# Patient Record
Sex: FEMALE | Race: WHITE | Employment: PART TIME | URBAN - NONMETROPOLITAN AREA
[De-identification: names, ages, dates, MRNs, and addresses within clinical notes are randomized per-mention and may not be internally consistent; named-entity substitution may affect disease eponyms.]

---

## 2019-09-06 ENCOUNTER — TELEPHONE (OUTPATIENT)
Dept: CARDIOLOGY | Age: 69
End: 2019-09-06

## 2019-09-06 NOTE — TELEPHONE ENCOUNTER
I spoke to the patient who stated that she is faxing me records from previous cardiologist Dr. Seth Springer and will also bring a hard copy to her visit next week.

## 2019-09-09 ENCOUNTER — OFFICE VISIT (OUTPATIENT)
Dept: CARDIOLOGY | Age: 69
End: 2019-09-09
Payer: MEDICARE

## 2019-09-09 VITALS
BODY MASS INDEX: 28.16 KG/M2 | DIASTOLIC BLOOD PRESSURE: 78 MMHG | HEART RATE: 66 BPM | SYSTOLIC BLOOD PRESSURE: 126 MMHG | WEIGHT: 153 LBS | HEIGHT: 62 IN

## 2019-09-09 DIAGNOSIS — I10 ESSENTIAL HYPERTENSION: ICD-10-CM

## 2019-09-09 DIAGNOSIS — R06.02 SHORTNESS OF BREATH: ICD-10-CM

## 2019-09-09 DIAGNOSIS — R00.2 PALPITATIONS: Primary | ICD-10-CM

## 2019-09-09 DIAGNOSIS — R07.89 OTHER CHEST PAIN: ICD-10-CM

## 2019-09-09 DIAGNOSIS — I34.0 NON-RHEUMATIC MITRAL REGURGITATION: ICD-10-CM

## 2019-09-09 DIAGNOSIS — Z82.49 FAMILY HISTORY OF HEART DISEASE: ICD-10-CM

## 2019-09-09 DIAGNOSIS — E78.2 MIXED HYPERLIPIDEMIA: ICD-10-CM

## 2019-09-09 PROCEDURE — G8419 CALC BMI OUT NRM PARAM NOF/U: HCPCS | Performed by: CLINICAL NURSE SPECIALIST

## 2019-09-09 PROCEDURE — 99204 OFFICE O/P NEW MOD 45 MIN: CPT | Performed by: CLINICAL NURSE SPECIALIST

## 2019-09-09 PROCEDURE — 4040F PNEUMOC VAC/ADMIN/RCVD: CPT | Performed by: CLINICAL NURSE SPECIALIST

## 2019-09-09 PROCEDURE — G8400 PT W/DXA NO RESULTS DOC: HCPCS | Performed by: CLINICAL NURSE SPECIALIST

## 2019-09-09 PROCEDURE — 3017F COLORECTAL CA SCREEN DOC REV: CPT | Performed by: CLINICAL NURSE SPECIALIST

## 2019-09-09 PROCEDURE — 1123F ACP DISCUSS/DSCN MKR DOCD: CPT | Performed by: CLINICAL NURSE SPECIALIST

## 2019-09-09 PROCEDURE — 93000 ELECTROCARDIOGRAM COMPLETE: CPT | Performed by: CLINICAL NURSE SPECIALIST

## 2019-09-09 PROCEDURE — G8427 DOCREV CUR MEDS BY ELIG CLIN: HCPCS | Performed by: CLINICAL NURSE SPECIALIST

## 2019-09-09 PROCEDURE — 1090F PRES/ABSN URINE INCON ASSESS: CPT | Performed by: CLINICAL NURSE SPECIALIST

## 2019-09-09 PROCEDURE — 1036F TOBACCO NON-USER: CPT | Performed by: CLINICAL NURSE SPECIALIST

## 2019-09-09 PROCEDURE — 0296T PR EXT ECG > 48HR TO 21 DAY RCRD W/CONECT INTL RCRD: CPT | Performed by: CLINICAL NURSE SPECIALIST

## 2019-09-09 RX ORDER — COLCHICINE 0.6 MG/1
0.6 TABLET ORAL 2 TIMES DAILY
COMMUNITY

## 2019-09-09 RX ORDER — CARISOPRODOL 350 MG/1
350 TABLET ORAL 4 TIMES DAILY PRN
COMMUNITY

## 2019-09-09 RX ORDER — FLUOXETINE HYDROCHLORIDE 40 MG/1
40 CAPSULE ORAL DAILY
COMMUNITY

## 2019-09-09 RX ORDER — ROSUVASTATIN CALCIUM 10 MG/1
10 TABLET, COATED ORAL DAILY
COMMUNITY

## 2019-09-09 RX ORDER — HYDROCODONE BITARTRATE AND ACETAMINOPHEN 10; 325 MG/1; MG/1
1 TABLET ORAL EVERY 6 HOURS PRN
COMMUNITY

## 2019-09-09 RX ORDER — LORATADINE 10 MG/1
10 TABLET ORAL DAILY
COMMUNITY

## 2019-09-09 RX ORDER — FLUTICASONE PROPIONATE 50 MCG
1 SPRAY, SUSPENSION (ML) NASAL DAILY
COMMUNITY

## 2019-09-09 RX ORDER — PREGABALIN 50 MG/1
50 CAPSULE ORAL 3 TIMES DAILY
COMMUNITY

## 2019-09-09 RX ORDER — GUAIFENESIN 600 MG/1
600 TABLET, EXTENDED RELEASE ORAL 2 TIMES DAILY
COMMUNITY

## 2019-09-09 RX ORDER — AMLODIPINE BESYLATE 5 MG/1
10 TABLET ORAL DAILY
COMMUNITY

## 2019-09-09 RX ORDER — LISINOPRIL 40 MG/1
40 TABLET ORAL DAILY
COMMUNITY

## 2019-09-09 RX ORDER — ALBUTEROL SULFATE 90 UG/1
2 AEROSOL, METERED RESPIRATORY (INHALATION) EVERY 6 HOURS PRN
COMMUNITY

## 2019-09-09 RX ORDER — FUROSEMIDE 20 MG/1
20 TABLET ORAL PRN
COMMUNITY

## 2019-09-09 RX ORDER — FOLIC ACID 1 MG/1
1 TABLET ORAL DAILY
COMMUNITY
End: 2019-09-09

## 2019-09-09 RX ORDER — OMEPRAZOLE 20 MG/1
20 CAPSULE, DELAYED RELEASE ORAL DAILY
COMMUNITY

## 2019-09-09 RX ORDER — PREDNISONE 1 MG/1
5 TABLET ORAL PRN
COMMUNITY

## 2019-09-09 RX ORDER — RIZATRIPTAN BENZOATE 10 MG/1
10 TABLET ORAL
COMMUNITY

## 2019-09-09 RX ORDER — ATENOLOL 50 MG/1
50 TABLET ORAL DAILY
COMMUNITY
End: 2019-10-16

## 2019-09-09 ASSESSMENT — ENCOUNTER SYMPTOMS
EYE REDNESS: 0
ABDOMINAL PAIN: 0
FACIAL SWELLING: 0
VOMITING: 0
WHEEZING: 0
CHEST TIGHTNESS: 1
SHORTNESS OF BREATH: 1
COUGH: 0
NAUSEA: 0

## 2019-09-09 NOTE — PATIENT INSTRUCTIONS
[place] at the [address] [facility] and [address] [facility] located on the first floor of [place] [address] through hospital main entrance and turn immediately to your left. Patient's contact number:  [phone] (home)      Lexiscan Stress Test      Lexiscan (regadenoson injection) is a prescription drug given through an IV line that increases blood flow through the arteries of the heart during a cardiac nuclear stress test.     There are two parts to a Lexiscan stress test: the rest portion and the exercise portion. For the rest portion, a radioactive tracer is injected into your arm through the IV. After 30 to 60 minutes, the process of imaging will begin. A nuclear camera will be placed on your chest area and images are taken for the next 15 to 20 minutes. For the exercise portion, a nurse will attach EKG electrodes to your chest to monitor your heart rate. The drug Bernardine Del Rosario is administered to simulate stress on the heart. Your heart rhythm will then be monitored for the next few minutes. Your blood pressure will also be monitored throughout the exercise portion. Atlantic through the exercise portion, a second round of radioactive tracer is injected into your body. Your heart rate and EKG will be monitored for another few minutes after administering the drug. Test Preparation:     Bring a list of your current medications. Do not take any of your medications the morning of the test, but bring all morning medications with you as you will take them after the stress portion of the test is completed.  Do not eat Bananas 24 hours prior to test.     No caffeine 24 hours prior to the testing. This includes: coffee, pop/soda, chocolate, cold medications, etc.  Any product that might contain caffeine.  No nicotine or alcohol 12 hours prior to your test.    Nothing to eat or drink 4-6 hours prior to appointment time.   It is okay to drink small amounts of water during the four hours prior

## 2019-09-19 ENCOUNTER — TELEPHONE (OUTPATIENT)
Dept: CARDIOLOGY | Age: 69
End: 2019-09-19

## 2019-10-07 ENCOUNTER — HOSPITAL ENCOUNTER (OUTPATIENT)
Dept: NON INVASIVE DIAGNOSTICS | Age: 69
Discharge: HOME | End: 2019-10-07
Payer: MEDICARE

## 2019-10-07 ENCOUNTER — HOSPITAL ENCOUNTER (OUTPATIENT)
Dept: NUCLEAR MEDICINE | Age: 69
Discharge: HOME | End: 2019-10-09
Payer: MEDICARE

## 2019-10-07 DIAGNOSIS — R06.02 SHORTNESS OF BREATH: ICD-10-CM

## 2019-10-07 DIAGNOSIS — R07.89 OTHER CHEST PAIN: ICD-10-CM

## 2019-10-07 DIAGNOSIS — I34.0 NON-RHEUMATIC MITRAL REGURGITATION: ICD-10-CM

## 2019-10-07 DIAGNOSIS — R00.2 PALPITATIONS: ICD-10-CM

## 2019-10-07 LAB
LV EF: 58 %
LVEF MODALITY: NORMAL

## 2019-10-07 PROCEDURE — A9500 TC99M SESTAMIBI: HCPCS | Performed by: CLINICAL NURSE SPECIALIST

## 2019-10-07 PROCEDURE — 6360000002 HC RX W HCPCS: Performed by: CLINICAL NURSE SPECIALIST

## 2019-10-07 PROCEDURE — 93306 TTE W/DOPPLER COMPLETE: CPT

## 2019-10-07 PROCEDURE — 78452 HT MUSCLE IMAGE SPECT MULT: CPT

## 2019-10-07 PROCEDURE — 93017 CV STRESS TEST TRACING ONLY: CPT

## 2019-10-07 PROCEDURE — 3430000000 HC RX DIAGNOSTIC RADIOPHARMACEUTICAL: Performed by: CLINICAL NURSE SPECIALIST

## 2019-10-07 RX ADMIN — TETRAKIS(2-METHOXYISOBUTYLISOCYANIDE)COPPER(I) TETRAFLUOROBORATE 10 MILLICURIE: 1 INJECTION, POWDER, LYOPHILIZED, FOR SOLUTION INTRAVENOUS at 13:53

## 2019-10-07 RX ADMIN — TETRAKIS(2-METHOXYISOBUTYLISOCYANIDE)COPPER(I) TETRAFLUOROBORATE 30 MILLICURIE: 1 INJECTION, POWDER, LYOPHILIZED, FOR SOLUTION INTRAVENOUS at 13:53

## 2019-10-07 RX ADMIN — REGADENOSON 0.4 MG: 0.08 INJECTION, SOLUTION INTRAVENOUS at 14:36

## 2019-10-08 LAB
LV EF: 79 %
LVEF MODALITY: NORMAL

## 2019-10-16 ENCOUNTER — TELEPHONE (OUTPATIENT)
Dept: CARDIOLOGY | Age: 69
End: 2019-10-16

## 2019-10-16 ENCOUNTER — OFFICE VISIT (OUTPATIENT)
Dept: CARDIOLOGY | Age: 69
End: 2019-10-16
Payer: MEDICARE

## 2019-10-16 VITALS
SYSTOLIC BLOOD PRESSURE: 136 MMHG | DIASTOLIC BLOOD PRESSURE: 82 MMHG | WEIGHT: 154 LBS | HEIGHT: 62 IN | BODY MASS INDEX: 28.34 KG/M2 | HEART RATE: 68 BPM

## 2019-10-16 DIAGNOSIS — I10 ESSENTIAL HYPERTENSION: Primary | ICD-10-CM

## 2019-10-16 PROCEDURE — 99213 OFFICE O/P EST LOW 20 MIN: CPT | Performed by: INTERNAL MEDICINE

## 2019-10-16 RX ORDER — ATENOLOL 50 MG/1
50 TABLET ORAL 2 TIMES DAILY
Qty: 180 TABLET | Refills: 3 | Status: SHIPPED | OUTPATIENT
Start: 2019-10-16

## 2019-10-16 ASSESSMENT — ENCOUNTER SYMPTOMS
CONSTIPATION: 0
WHEEZING: 0
BLOOD IN STOOL: 0
SHORTNESS OF BREATH: 0
EYE DISCHARGE: 0
DIARRHEA: 0
BACK PAIN: 0
VOMITING: 0
ABDOMINAL DISTENTION: 0
COUGH: 0

## 2019-10-24 ENCOUNTER — HOSPITAL ENCOUNTER (OUTPATIENT)
Dept: CARDIAC CATH/INVASIVE PROCEDURES | Age: 69
Discharge: HOME | End: 2019-10-24
Attending: INTERNAL MEDICINE | Admitting: INTERNAL MEDICINE
Payer: MEDICARE

## 2019-10-24 VITALS
WEIGHT: 154 LBS | BODY MASS INDEX: 28.34 KG/M2 | HEART RATE: 59 BPM | RESPIRATION RATE: 14 BRPM | HEIGHT: 62 IN | OXYGEN SATURATION: 94 % | DIASTOLIC BLOOD PRESSURE: 88 MMHG | SYSTOLIC BLOOD PRESSURE: 150 MMHG | TEMPERATURE: 97.9 F

## 2019-10-24 LAB
ALBUMIN SERPL-MCNC: 4.6 G/DL (ref 3.5–5.2)
ALP BLD-CCNC: 127 U/L (ref 35–104)
ALT SERPL-CCNC: 18 U/L (ref 5–33)
ANION GAP SERPL CALCULATED.3IONS-SCNC: 11 MMOL/L (ref 7–19)
AST SERPL-CCNC: 30 U/L (ref 5–32)
BILIRUB SERPL-MCNC: 0.3 MG/DL (ref 0.2–1.2)
BUN BLDV-MCNC: 13 MG/DL (ref 8–23)
CALCIUM SERPL-MCNC: 9.2 MG/DL (ref 8.8–10.2)
CHLORIDE BLD-SCNC: 100 MMOL/L (ref 98–111)
CO2: 29 MMOL/L (ref 22–29)
CREAT SERPL-MCNC: 0.6 MG/DL (ref 0.5–0.9)
GFR NON-AFRICAN AMERICAN: >60
GLUCOSE BLD-MCNC: 97 MG/DL (ref 74–109)
HCT VFR BLD CALC: 47 % (ref 37–47)
HEMOGLOBIN: 15.2 G/DL (ref 12–16)
MCH RBC QN AUTO: 32.4 PG (ref 27–31)
MCHC RBC AUTO-ENTMCNC: 32.3 G/DL (ref 33–37)
MCV RBC AUTO: 100.2 FL (ref 81–99)
PDW BLD-RTO: 12 % (ref 11.5–14.5)
PLATELET # BLD: 220 K/UL (ref 130–400)
PMV BLD AUTO: 10.5 FL (ref 9.4–12.3)
POTASSIUM SERPL-SCNC: 4.6 MMOL/L (ref 3.5–5)
RBC # BLD: 4.69 M/UL (ref 4.2–5.4)
SODIUM BLD-SCNC: 140 MMOL/L (ref 136–145)
TOTAL PROTEIN: 7.4 G/DL (ref 6.6–8.7)
WBC # BLD: 7 K/UL (ref 4.8–10.8)

## 2019-10-24 PROCEDURE — 2580000003 HC RX 258: Performed by: INTERNAL MEDICINE

## 2019-10-24 PROCEDURE — C1769 GUIDE WIRE: HCPCS

## 2019-10-24 PROCEDURE — 6360000002 HC RX W HCPCS

## 2019-10-24 PROCEDURE — 2500000003 HC RX 250 WO HCPCS

## 2019-10-24 PROCEDURE — C1887 CATHETER, GUIDING: HCPCS

## 2019-10-24 PROCEDURE — C1894 INTRO/SHEATH, NON-LASER: HCPCS

## 2019-10-24 PROCEDURE — 85027 COMPLETE CBC AUTOMATED: CPT

## 2019-10-24 PROCEDURE — 93458 L HRT ARTERY/VENTRICLE ANGIO: CPT | Performed by: INTERNAL MEDICINE

## 2019-10-24 PROCEDURE — 99152 MOD SED SAME PHYS/QHP 5/>YRS: CPT

## 2019-10-24 PROCEDURE — 36415 COLL VENOUS BLD VENIPUNCTURE: CPT

## 2019-10-24 PROCEDURE — 99152 MOD SED SAME PHYS/QHP 5/>YRS: CPT | Performed by: INTERNAL MEDICINE

## 2019-10-24 PROCEDURE — 93458 L HRT ARTERY/VENTRICLE ANGIO: CPT

## 2019-10-24 PROCEDURE — 80053 COMPREHEN METABOLIC PANEL: CPT

## 2019-10-24 PROCEDURE — 6360000004 HC RX CONTRAST MEDICATION: Performed by: INTERNAL MEDICINE

## 2019-10-24 PROCEDURE — 2709999900 HC NON-CHARGEABLE SUPPLY

## 2019-10-24 RX ORDER — SODIUM CHLORIDE 9 MG/ML
INJECTION, SOLUTION INTRAVENOUS CONTINUOUS
Status: DISCONTINUED | OUTPATIENT
Start: 2019-10-24 | End: 2019-10-24 | Stop reason: HOSPADM

## 2019-10-24 RX ORDER — SODIUM CHLORIDE 0.9 % (FLUSH) 0.9 %
10 SYRINGE (ML) INJECTION EVERY 12 HOURS SCHEDULED
Status: DISCONTINUED | OUTPATIENT
Start: 2019-10-24 | End: 2019-10-24 | Stop reason: HOSPADM

## 2019-10-24 RX ORDER — ONDANSETRON 2 MG/ML
4 INJECTION INTRAMUSCULAR; INTRAVENOUS EVERY 6 HOURS PRN
Status: DISCONTINUED | OUTPATIENT
Start: 2019-10-24 | End: 2019-10-24 | Stop reason: HOSPADM

## 2019-10-24 RX ORDER — ALPRAZOLAM 0.5 MG/1
0.5 TABLET ORAL
Status: DISCONTINUED | OUTPATIENT
Start: 2019-10-24 | End: 2019-10-24 | Stop reason: HOSPADM

## 2019-10-24 RX ORDER — NITROGLYCERIN 0.4 MG/1
0.4 TABLET SUBLINGUAL EVERY 5 MIN PRN
Status: DISCONTINUED | OUTPATIENT
Start: 2019-10-24 | End: 2019-10-24 | Stop reason: HOSPADM

## 2019-10-24 RX ORDER — ACETAMINOPHEN 325 MG/1
650 TABLET ORAL EVERY 4 HOURS PRN
Status: DISCONTINUED | OUTPATIENT
Start: 2019-10-24 | End: 2019-10-24 | Stop reason: HOSPADM

## 2019-10-24 RX ORDER — SODIUM CHLORIDE 0.9 % (FLUSH) 0.9 %
10 SYRINGE (ML) INJECTION PRN
Status: DISCONTINUED | OUTPATIENT
Start: 2019-10-24 | End: 2019-10-24 | Stop reason: HOSPADM

## 2019-10-24 RX ADMIN — IOPAMIDOL 62 ML: 612 INJECTION, SOLUTION INTRAVENOUS at 10:53

## 2019-10-24 RX ADMIN — SODIUM CHLORIDE: 9 INJECTION, SOLUTION INTRAVENOUS at 10:09

## 2020-01-17 ENCOUNTER — OFFICE VISIT (OUTPATIENT)
Dept: CARDIOLOGY | Age: 70
End: 2020-01-17
Payer: MEDICARE

## 2020-01-17 VITALS
WEIGHT: 155 LBS | SYSTOLIC BLOOD PRESSURE: 108 MMHG | HEIGHT: 62 IN | BODY MASS INDEX: 28.52 KG/M2 | HEART RATE: 68 BPM | DIASTOLIC BLOOD PRESSURE: 70 MMHG

## 2020-01-17 PROCEDURE — G8484 FLU IMMUNIZE NO ADMIN: HCPCS | Performed by: INTERNAL MEDICINE

## 2020-01-17 PROCEDURE — 4040F PNEUMOC VAC/ADMIN/RCVD: CPT | Performed by: INTERNAL MEDICINE

## 2020-01-17 PROCEDURE — 1123F ACP DISCUSS/DSCN MKR DOCD: CPT | Performed by: INTERNAL MEDICINE

## 2020-01-17 PROCEDURE — 3017F COLORECTAL CA SCREEN DOC REV: CPT | Performed by: INTERNAL MEDICINE

## 2020-01-17 PROCEDURE — G8427 DOCREV CUR MEDS BY ELIG CLIN: HCPCS | Performed by: INTERNAL MEDICINE

## 2020-01-17 PROCEDURE — 1090F PRES/ABSN URINE INCON ASSESS: CPT | Performed by: INTERNAL MEDICINE

## 2020-01-17 PROCEDURE — 1036F TOBACCO NON-USER: CPT | Performed by: INTERNAL MEDICINE

## 2020-01-17 PROCEDURE — G8417 CALC BMI ABV UP PARAM F/U: HCPCS | Performed by: INTERNAL MEDICINE

## 2020-01-17 PROCEDURE — G8400 PT W/DXA NO RESULTS DOC: HCPCS | Performed by: INTERNAL MEDICINE

## 2020-01-17 PROCEDURE — 99213 OFFICE O/P EST LOW 20 MIN: CPT | Performed by: INTERNAL MEDICINE

## 2020-01-17 ASSESSMENT — ENCOUNTER SYMPTOMS
CONSTIPATION: 0
DIARRHEA: 0
COUGH: 0
BLOOD IN STOOL: 0
EYE DISCHARGE: 0
WHEEZING: 0
SHORTNESS OF BREATH: 0
BACK PAIN: 0
ABDOMINAL DISTENTION: 0
VOMITING: 0

## 2020-01-17 NOTE — PROGRESS NOTES
[address] [facility]  Cardiology Office Note  [address] [id number]  Phone: [phone]  Fax: [phone]                            Date:  1/17/2020  Patient: Julius Fernandez  Age:  78 y.o., 1950    Referral: No ref. provider found      PROBLEM LIST:    Patient Active Problem List    Diagnosis Date Noted    Coronary artery disease involving native coronary artery      Priority: High    Essential hypertension 09/09/2019     Priority: Low    Mixed hyperlipidemia 09/09/2019     Priority: Low     1.  Severe exertional fatigue x6 months, negative Lexiscan study, normal LV ejection fraction, mild mitral regurgitation, mild aortic regurgitation, nonobstructive CAD with normal LV ejection fraction (catheterization 10/24/2019). 2.  Palpitations with Holter showing brief bouts of SVT with significant PACs.  EKG with sinus, right bundle branch block. 3.  Hypertension. 4.  History of SLE. 5.  Strong family history of premature coronary artery disease    PRESENTATION: Julius Fernandez is a 78y.o. year old female presents for follow-up evaluation. Since her last visit she did undergo cardiac catheterization which revealed nonobstructive CAD. Since changing her atenolol dosing at night to later she has been feeling much better. She reports less fatigue as well. REVIEW OF SYSTEMS:  Review of Systems   Constitutional: Negative for activity change, fatigue and fever. HENT: Negative for ear pain, hearing loss and tinnitus. Eyes: Negative for discharge and visual disturbance. Respiratory: Negative for cough, shortness of breath and wheezing. Cardiovascular: Negative for chest pain, palpitations and leg swelling. Gastrointestinal: Negative for abdominal distention, blood in stool, constipation, diarrhea and vomiting. Endocrine: Negative for cold intolerance, heat intolerance, polydipsia and polyuria. Genitourinary: Negative for dysuria and hematuria. Musculoskeletal: Negative for arthralgias, back pain and myalgias. Skin: Negative for pallor and rash. Neurological: Negative for seizures, syncope, weakness and headaches. Psychiatric/Behavioral: Negative for behavioral problems and dysphoric mood. Past Medical History:      Diagnosis Date    GERD (gastroesophageal reflux disease)     Gout     Hyperglycemia     Hyperlipidemia     Hypertension     Migraine     Systemic lupus erythematosus (HCC)        Past Surgical History:      Procedure Laterality Date    BREAST BIOPSY      HYSTERECTOMY, TOTAL ABDOMINAL      KNEE ARTHROSCOPY Right        Medications:  Current Outpatient Medications   Medication Sig Dispense Refill    atenolol (TENORMIN) 50 MG tablet Take 1 tablet by mouth 2 times daily 180 tablet 3    pregabalin (LYRICA) 50 MG capsule Take 50 mg by mouth 3 times daily.  amLODIPine (NORVASC) 5 MG tablet Take 5 mg by mouth daily      estrogens, conjugated, (PREMARIN) 0.9 MG tablet Take 0.9 mg by mouth three times a week       FLUoxetine (PROZAC) 40 MG capsule Take 40 mg by mouth daily      rosuvastatin (CRESTOR) 10 MG tablet Take 10 mg by mouth daily      lisinopril (PRINIVIL;ZESTRIL) 40 MG tablet Take 40 mg by mouth daily      albuterol sulfate  (90 Base) MCG/ACT inhaler Inhale 2 puffs into the lungs every 6 hours as needed for Wheezing      aspirin 81 MG tablet Take 81 mg by mouth daily      carisoprodol (SOMA) 350 MG tablet Take 350 mg by mouth 4 times daily as needed for Muscle spasms.  rizatriptan (MAXALT) 10 MG tablet Take 10 mg by mouth once as needed for Migraine May repeat in 2 hours if needed      furosemide (LASIX) 20 MG tablet Take 20 mg by mouth as needed       colchicine (COLCRYS) 0.6 MG tablet Take 0.6 mg by mouth 2 times daily       HYDROcodone-acetaminophen (NORCO)  MG per tablet Take 1 tablet by mouth every 6 hours as needed for Pain.       omeprazole (PRILOSEC) 20 MG delayed release on file   Social History Narrative    Not on file       Family History:       Problem Relation Age of Onset    Hypertension Mother     Diabetes Mother     Coronary Art Dis Father     Hypertension Father     Heart Attack Paternal Grandfather          age 43 of [place]         Physical Examination:  /70   Pulse 68   Ht 5' 2\" (1.575 m)   Wt 155 lb (70.3 kg)   BMI 28.35 kg/m²   Physical Exam  Constitutional:       General: She is not in acute distress. Appearance: She is not diaphoretic. Comments: Blood pressure right arm sitting 110/70 mmHg, pulse 64 bpm regular   HENT:      Mouth/Throat:      Pharynx: No oropharyngeal exudate. Eyes:      General: No scleral icterus. Right eye: No discharge. Left eye: No discharge. Neck:      Thyroid: No thyromegaly. Vascular: No JVD. Cardiovascular:      Rate and Rhythm: Normal rate and regular rhythm. No extrasystoles are present. Heart sounds: Normal heart sounds, S1 normal and S2 normal. No murmur. No systolic murmur. No diastolic murmur. No friction rub. No gallop. No S3 or S4 sounds. Pulmonary:      Effort: Pulmonary effort is normal. No respiratory distress. Breath sounds: Normal breath sounds. No wheezing or rales. Chest:      Chest wall: No tenderness. Abdominal:      General: Bowel sounds are normal. There is no distension. Palpations: Abdomen is soft. There is no mass. Tenderness: There is no tenderness. There is no guarding or rebound. Hernia: No hernia is present. Musculoskeletal: Normal range of motion. Skin:     General: Skin is warm. Coloration: Skin is not pale. Findings: No rash. Neurological:      Mental Status: She is alert and oriented to person, place, and time. Cranial Nerves: No cranial nerve deficit. Deep Tendon Reflexes: Reflexes normal.           Labs:   CBC: No results for input(s): WBC, HGB, HCT, PLT in the last 72 hours.   BMP:No results for input(s): NA, K, CO2, BUN, CREATININE, LABGLOM, GLUCOSE in the last 72 hours. BNP: No results for input(s): BNP in the last 72 hours. PT/INR: No results for input(s): PROTIME, INR in the last 72 hours. APTT:No results for input(s): APTT in the last 72 hours. CARDIAC ENZYMES:No results for input(s): CKTOTAL, CKMB, CKMBINDEX, TROPONINI in the last 72 hours. FASTING LIPID PANEL:No results found for: HDL, LDLDIRECT, LDLCALC, TRIG  LIVER PROFILE:No results for input(s): AST, ALT, LABALBU in the last 72 hours. Imaging:     Procedure     Procedure Type      Diagnostic procedure:DCA, Coronary Angiogram, Left Heart Cath, Left   Ventriculogram, Left Ventricular Pressure Measurement      Conclusions      Nonobstructive CAD. Normal LV ejection fraction. Mild elevation LV end-diastolic pressures consistent with mild diastolic   dysfunction. Recommendations      Medical management      Signatures      ----------------------------------------------------------------   Electronically signed by Coral Jeffers MD(Performing Physician) on   10/24/2019 11:24   ----------------------------------------------------------------      Angiographic Findings      Cardiac Arteries and Lesion Findings     LMCA: Left Main widely patent     LAD: LAD mid 35% stenosis       Lesion on Mid LAD: 35% stenosis 12 mm length. LCx: Circumflex proximal 20% stenosis       Lesion on Prox CX: 20% stenosis 6 mm length. RCA: RCA mid 30% stenosis. Lesion on Prox RCA: 30% stenosis 15 mm length.       VA     Ventriculography Findings  LVEDP equals 18 mmHg     LV function assessed as:Normal.  Ejection Fraction    - Method: LV gram. EF%: 60.       ASSESSMENT and PLAN:    76-year-old female patient with past medical history of hypertension, SLE, family history of premature coronary artery disease, presenting with progressive and worsening exertional fatigue over the last 6 months with increased palpitations consistent with PACs and short bouts of

## 2020-07-20 ENCOUNTER — OFFICE VISIT (OUTPATIENT)
Dept: CARDIOLOGY | Age: 70
End: 2020-07-20
Payer: MEDICARE

## 2020-07-20 VITALS
SYSTOLIC BLOOD PRESSURE: 98 MMHG | HEART RATE: 67 BPM | DIASTOLIC BLOOD PRESSURE: 68 MMHG | WEIGHT: 143 LBS | BODY MASS INDEX: 26.31 KG/M2 | HEIGHT: 62 IN

## 2020-07-20 PROCEDURE — G8400 PT W/DXA NO RESULTS DOC: HCPCS | Performed by: CLINICAL NURSE SPECIALIST

## 2020-07-20 PROCEDURE — 1123F ACP DISCUSS/DSCN MKR DOCD: CPT | Performed by: CLINICAL NURSE SPECIALIST

## 2020-07-20 PROCEDURE — 1090F PRES/ABSN URINE INCON ASSESS: CPT | Performed by: CLINICAL NURSE SPECIALIST

## 2020-07-20 PROCEDURE — G8417 CALC BMI ABV UP PARAM F/U: HCPCS | Performed by: CLINICAL NURSE SPECIALIST

## 2020-07-20 PROCEDURE — 99213 OFFICE O/P EST LOW 20 MIN: CPT | Performed by: CLINICAL NURSE SPECIALIST

## 2020-07-20 PROCEDURE — G8427 DOCREV CUR MEDS BY ELIG CLIN: HCPCS | Performed by: CLINICAL NURSE SPECIALIST

## 2020-07-20 PROCEDURE — 1036F TOBACCO NON-USER: CPT | Performed by: CLINICAL NURSE SPECIALIST

## 2020-07-20 PROCEDURE — 4040F PNEUMOC VAC/ADMIN/RCVD: CPT | Performed by: CLINICAL NURSE SPECIALIST

## 2020-07-20 PROCEDURE — 3017F COLORECTAL CA SCREEN DOC REV: CPT | Performed by: CLINICAL NURSE SPECIALIST

## 2020-07-20 RX ORDER — FOLIC ACID 1 MG/1
TABLET ORAL
COMMUNITY
Start: 2020-07-06

## 2020-07-20 ASSESSMENT — ENCOUNTER SYMPTOMS
CHEST TIGHTNESS: 0
NAUSEA: 0
COUGH: 0
SHORTNESS OF BREATH: 0
WHEEZING: 0
VOMITING: 0
ABDOMINAL PAIN: 0
EYE REDNESS: 0
FACIAL SWELLING: 0

## 2020-07-20 NOTE — PROGRESS NOTES
[facility], [address], [address] [address]  Phone: [phone]  Fax: [phone]    OFFICE VISIT:  2020    [place]iver[place]: 1950    Reason For Visit:  Robin Knowles is a 78 y.o. female who is here for Palpitations (No cardiac sx today. ) and Hypertension    HPI  Patient is here for follow-up today with a history of nonobstructive CAD found per heart cath last year. Other history includes hypertension hyperlipidemia. She has lost weight since last visit and is trying to get goingan exercise regimen. Priscila Plasencia is PCP.   Suyapa Emmanuel has the following history as recorded in [facility]:    Patient Active Problem List    Diagnosis Date Noted    Coronary artery disease involving native coronary artery      Priority: High    Essential hypertension 2019    Mixed hyperlipidemia 2019     Past Medical History:   Diagnosis Date    GERD (gastroesophageal reflux disease)     Gout     Hyperglycemia     Hyperlipidemia     Hypertension     Migraine     Psoriatic arthritis ([address]     Systemic lupus erythematosus (HCC)      Past Surgical History:   Procedure Laterality Date    BREAST BIOPSY      HYSTERECTOMY, TOTAL ABDOMINAL      KNEE ARTHROSCOPY Right      Family History   Problem Relation Age of Onset    Hypertension Mother     Diabetes Mother     Coronary Art Dis Father     Hypertension Father     Heart Attack Paternal Grandfather          age 43 of [place]     Social History     Tobacco Use    Smoking status: Never Smoker    Smokeless tobacco: Never Used   Substance Use Topics    Alcohol use: Not Currently      Current Outpatient Medications   Medication Sig Dispense Refill    methotrexate (RHEUMATREX) 2.5 MG chemo tablet Take 2.5 mg by mouth every 7 days Only on Monday      folic acid (FOLVITE) 1 MG tablet TAKE ONE TABLET BY MOUTH EVERY DAY      atenolol (TENORMIN) 50 MG tablet Take 1 tablet by mouth 2 times daily 180 tablet 3    pregabalin (LYRICA) 50 MG capsule Take 50 mg by mouth 3 times daily.  amLODIPine (NORVASC) 5 MG tablet Take 5 mg by mouth daily      estrogens, conjugated, (PREMARIN) 0.9 MG tablet Take 0.9 mg by mouth three times a week       FLUoxetine (PROZAC) 40 MG capsule Take 40 mg by mouth daily      rosuvastatin (CRESTOR) 10 MG tablet Take 10 mg by mouth daily      lisinopril (PRINIVIL;ZESTRIL) 40 MG tablet Take 40 mg by mouth daily      albuterol sulfate  (90 Base) MCG/ACT inhaler Inhale 2 puffs into the lungs every 6 hours as needed for Wheezing      aspirin 81 MG tablet Take 81 mg by mouth daily      carisoprodol (SOMA) 350 MG tablet Take 350 mg by mouth 4 times daily as needed for Muscle spasms.  rizatriptan (MAXALT) 10 MG tablet Take 10 mg by mouth once as needed for Migraine May repeat in 2 hours if needed      furosemide (LASIX) 20 MG tablet Take 20 mg by mouth as needed       colchicine (COLCRYS) 0.6 MG tablet Take 0.6 mg by mouth 2 times daily       HYDROcodone-acetaminophen (NORCO)  MG per tablet Take 1 tablet by mouth every 6 hours as needed for Pain.  omeprazole (PRILOSEC) 20 MG delayed release capsule Take 20 mg by mouth daily      predniSONE (DELTASONE) 5 MG tablet Take 5 mg by mouth as needed       fluticasone (FLONASE) 50 MCG/ACT nasal spray 1 spray by Each Nostril route daily      loratadine (CLARITIN) 10 MG tablet Take 10 mg by mouth daily      guaiFENesin (MUCINEX) 600 MG extended release tablet Take 600 mg by mouth 2 times daily        No current facility-administered medications for this visit. Allergies: Biaxin [clarithromycin]; Hctz [hydrochlorothiazide]; Hepatitis b virus vaccines; Imitrex [sumatriptan]; Influenza vaccines; Pcn [penicillins]; Sulfa antibiotics; and Vioxx [rofecoxib]    Review of Systems  Review of Systems   Constitutional: Positive for fatigue (improved). Negative for activity change, diaphoresis, fever and unexpected weight change. HENT: Negative for facial swelling and nosebleeds. Eyes: Negative for redness and visual disturbance. Respiratory: Negative for cough, chest tightness, shortness of breath and wheezing. Cardiovascular: Positive for palpitations (rare, brief). Negative for chest pain and leg swelling. Gastrointestinal: Negative for abdominal pain, nausea and vomiting. Endocrine: Negative for cold intolerance and heat intolerance. Genitourinary: Negative for dysuria and hematuria. Musculoskeletal: Negative for arthralgias and myalgias. Skin: Negative for pallor and rash. Neurological: Negative for dizziness, seizures, syncope, weakness and light-headedness. Hematological: Does not bruise/bleed easily. Psychiatric/Behavioral: Negative for agitation. The patient is not nervous/anxious. Objective  Vital Signs - BP 98/68   Pulse 67   Ht 5' 2\" (1.575 m)   Wt 143 lb (64.9 kg)   Breastfeeding No   BMI 26.16 kg/m²    Wt Readings from Last 3 Encounters:   07/20/20 143 lb (64.9 kg)   01/17/20 155 lb (70.3 kg)   10/24/19 154 lb (69.9 kg)      Physical Exam  Vitals signs and nursing note reviewed. Constitutional:       General: She is not in acute distress. Appearance: Normal appearance. She is well-developed. HENT:      Head: Normocephalic and atraumatic. Right Ear: Hearing and external ear normal.      Left Ear: Hearing and external ear normal.      Nose: Nose normal.   Eyes:      General:         Right eye: No discharge. Left eye: No discharge. Pupils: Pupils are equal, round, and reactive to light. Neck:      Musculoskeletal: Neck supple. No muscular tenderness. Thyroid: No thyromegaly. Vascular: No carotid bruit or JVD. Trachea: No tracheal deviation. Cardiovascular:      Rate and Rhythm: Normal rate and regular rhythm. Heart sounds: Murmur (1/6 systolic) present. No friction rub. No gallop.        Comments: No carotid bruit  Pulmonary:      Effort:

## 2020-07-20 NOTE — PATIENT INSTRUCTIONS
Return for APRN. 1 year  Encourage healthy diet and exercise  Keep cholesterol and BP under control    Call with any questionsor concerns  Follow up with Car Sidhu for non cardiac problems  Report any new problems  Cardiovascular Fitness-Exercise as tolerated. Strive for 15 minutes of exercise most days of the week. Cardiac / HealthyDiet  Continue current medications as directed  Continue plan of treatment  It is always recommended that you bring your medicationsbottles with you to each visit - this is for your safety!

## 2021-07-20 ENCOUNTER — OFFICE VISIT (OUTPATIENT)
Dept: CARDIOLOGY CLINIC | Age: 71
End: 2021-07-20
Payer: MEDICARE

## 2021-07-20 VITALS
DIASTOLIC BLOOD PRESSURE: 72 MMHG | BODY MASS INDEX: 29.26 KG/M2 | WEIGHT: 159 LBS | HEART RATE: 66 BPM | SYSTOLIC BLOOD PRESSURE: 100 MMHG | OXYGEN SATURATION: 98 % | HEIGHT: 62 IN

## 2021-07-20 DIAGNOSIS — I25.10 CORONARY ARTERY DISEASE INVOLVING NATIVE CORONARY ARTERY OF NATIVE HEART WITHOUT ANGINA PECTORIS: Primary | ICD-10-CM

## 2021-07-20 DIAGNOSIS — I10 ESSENTIAL HYPERTENSION: ICD-10-CM

## 2021-07-20 DIAGNOSIS — E78.2 MIXED HYPERLIPIDEMIA: ICD-10-CM

## 2021-07-20 PROCEDURE — 4040F PNEUMOC VAC/ADMIN/RCVD: CPT | Performed by: CLINICAL NURSE SPECIALIST

## 2021-07-20 PROCEDURE — 1090F PRES/ABSN URINE INCON ASSESS: CPT | Performed by: CLINICAL NURSE SPECIALIST

## 2021-07-20 PROCEDURE — 3017F COLORECTAL CA SCREEN DOC REV: CPT | Performed by: CLINICAL NURSE SPECIALIST

## 2021-07-20 PROCEDURE — 99213 OFFICE O/P EST LOW 20 MIN: CPT | Performed by: CLINICAL NURSE SPECIALIST

## 2021-07-20 PROCEDURE — G8400 PT W/DXA NO RESULTS DOC: HCPCS | Performed by: CLINICAL NURSE SPECIALIST

## 2021-07-20 PROCEDURE — 1036F TOBACCO NON-USER: CPT | Performed by: CLINICAL NURSE SPECIALIST

## 2021-07-20 PROCEDURE — G8427 DOCREV CUR MEDS BY ELIG CLIN: HCPCS | Performed by: CLINICAL NURSE SPECIALIST

## 2021-07-20 PROCEDURE — 1123F ACP DISCUSS/DSCN MKR DOCD: CPT | Performed by: CLINICAL NURSE SPECIALIST

## 2021-07-20 PROCEDURE — 93000 ELECTROCARDIOGRAM COMPLETE: CPT | Performed by: CLINICAL NURSE SPECIALIST

## 2021-07-20 PROCEDURE — G8417 CALC BMI ABV UP PARAM F/U: HCPCS | Performed by: CLINICAL NURSE SPECIALIST

## 2021-07-20 ASSESSMENT — ENCOUNTER SYMPTOMS
SHORTNESS OF BREATH: 0
VOMITING: 0
FACIAL SWELLING: 0
EYE REDNESS: 0
COUGH: 0
WHEEZING: 0
NAUSEA: 0
CHEST TIGHTNESS: 0
ABDOMINAL PAIN: 0

## 2021-07-20 NOTE — PATIENT INSTRUCTIONS
Return in about 1 year (around 7/20/2022) for APRN.  1 year  Encourage healthy diet and exercise  Keep cholesterol and BP under control

## 2021-07-20 NOTE — PROGRESS NOTES
[facility], [address] [place] [address], [address] [address]  Phone: [phone]  Fax: [phone]    OFFICE VISIT:  2021    Fiorella Ayala - : 1950    Reason For Visit:  Lynn Cifuentes is a 69 y.o. female who is here for Follow-up (Patient states BP has been up and c/o edema in ankles and feet), Coronary Artery Disease, and Hypertension    HPI  Patient is here for follow-up today with a history of nonobstructive CAD found per heart cath in 2019. Other history includes hypertension hyperlipidemia. Patient reports she has recently had issues with hypertension and and amlodipine has been added. She also reports some significant lower extremity edema because she forgot to take her Lasix with her on a recent trip. She is back to baseline now. Patient denies sudden weight gain, chest pain, orthopnea, PND, palpitations. Constantin Edward is PCP.   Fiorella Ayala has the following history as recorded in [facility]:    Patient Active Problem List    Diagnosis Date Noted    Coronary artery disease involving native coronary artery     Essential hypertension 2019    Mixed hyperlipidemia 2019     Past Medical History:   Diagnosis Date    GERD (gastroesophageal reflux disease)     Gout     Hyperglycemia     Hyperlipidemia     Hypertension     Migraine     [address] [address]     Systemic lupus erythematosus (HCC)      Past Surgical History:   Procedure Laterality Date    BREAST BIOPSY      HYSTERECTOMY, TOTAL ABDOMINAL      KNEE ARTHROSCOPY Right      Family History   Problem Relation Age of Onset    Hypertension Mother     Diabetes Mother     Coronary Art Dis Father     Hypertension Father     Heart Attack Paternal Grandfather          age 43 of [place]     Social History     Tobacco Use    Smoking status: Never Smoker    Smokeless tobacco: Never Used   Substance Use Topics    Alcohol use: Not Currently      Current Outpatient Medications Medication Sig Dispense Refill    methotrexate (RHEUMATREX) 2.5 MG chemo tablet Take 2.5 mg by mouth every 7 days Only on Monday      folic acid (FOLVITE) 1 MG tablet TAKE ONE TABLET BY MOUTH EVERY DAY      atenolol (TENORMIN) 50 MG tablet Take 1 tablet by mouth 2 times daily 180 tablet 3    pregabalin (LYRICA) 50 MG capsule Take 50 mg by mouth 3 times daily.  amLODIPine (NORVASC) 5 MG tablet Take 10 mg by mouth daily       estrogens, conjugated, (PREMARIN) 0.9 MG tablet Take 0.9 mg by mouth three times a week       FLUoxetine (PROZAC) 40 MG capsule Take 40 mg by mouth daily      rosuvastatin (CRESTOR) 10 MG tablet Take 10 mg by mouth daily      lisinopril (PRINIVIL;ZESTRIL) 40 MG tablet Take 40 mg by mouth daily      albuterol sulfate  (90 Base) MCG/ACT inhaler Inhale 2 puffs into the lungs every 6 hours as needed for Wheezing      aspirin 81 MG tablet Take 81 mg by mouth daily      carisoprodol (SOMA) 350 MG tablet Take 350 mg by mouth 4 times daily as needed for Muscle spasms.  rizatriptan (MAXALT) 10 MG tablet Take 10 mg by mouth once as needed for Migraine May repeat in 2 hours if needed      furosemide (LASIX) 20 MG tablet Take 20 mg by mouth as needed       colchicine (COLCRYS) 0.6 MG tablet Take 0.6 mg by mouth 2 times daily       HYDROcodone-acetaminophen (NORCO)  MG per tablet Take 1 tablet by mouth every 6 hours as needed for Pain.  omeprazole (PRILOSEC) 20 MG delayed release capsule Take 20 mg by mouth daily      predniSONE (DELTASONE) 5 MG tablet Take 5 mg by mouth as needed       fluticasone (FLONASE) 50 MCG/ACT nasal spray 1 spray by Each Nostril route daily      loratadine (CLARITIN) 10 MG tablet Take 10 mg by mouth daily      guaiFENesin (MUCINEX) 600 MG extended release tablet Take 600 mg by mouth 2 times daily        No current facility-administered medications for this visit.      Allergies: Biaxin [clarithromycin], Hctz [hydrochlorothiazide], Hepatitis b virus vaccines, Imitrex [sumatriptan], Influenza vaccines, Pcn [penicillins], Sulfa antibiotics, and Vioxx [rofecoxib]    Review of Systems  Review of Systems   Constitutional: Positive for fatigue (improved). Negative for activity change, diaphoresis, fever and unexpected weight change. HENT: Negative for facial swelling and nosebleeds. Eyes: Negative for redness and visual disturbance. Respiratory: Negative for cough, chest tightness, shortness of breath and wheezing. Cardiovascular: Negative for chest pain, palpitations and leg swelling. Gastrointestinal: Negative for abdominal pain, nausea and vomiting. Endocrine: Negative for cold intolerance and heat intolerance. Genitourinary: Negative for dysuria and hematuria. Musculoskeletal: Negative for arthralgias and myalgias. Skin: Negative for pallor and rash. Neurological: Negative for dizziness, seizures, syncope, weakness and light-headedness. Hematological: Does not bruise/bleed easily. Psychiatric/Behavioral: Negative for agitation. The patient is not nervous/anxious. Objective  Vital Signs - /72   Pulse 66   Ht 5' 2\" (1.575 m)   Wt 159 lb (72.1 kg)   SpO2 98%   BMI 29.08 kg/m²    Wt Readings from Last 3 Encounters:   07/20/21 159 lb (72.1 kg)   07/20/20 143 lb (64.9 kg)   01/17/20 155 lb (70.3 kg)      Physical Exam  Vitals and nursing note reviewed. Constitutional:       General: She is not in acute distress. Appearance: Normal appearance. She is well-developed. HENT:      Head: Normocephalic and atraumatic. Right Ear: Hearing and external ear normal.      Left Ear: Hearing and external ear normal.      Nose: Nose normal.   Eyes:      General:         Right eye: No discharge. Left eye: No discharge. Pupils: Pupils are equal, round, and reactive to light. Neck:      Thyroid: No thyromegaly. Vascular: No carotid bruit or JVD. Trachea: No tracheal deviation. recently    Plan    Return in about 1 year (around 7/20/2022) for APRN. 1 year  Encourage healthy diet and exercise  Keep cholesterol and BP under control    Call with any questionsor concerns  Follow up with Cassius Calderon for non cardiac problems  Report any new problems  Cardiovascular Fitness-Exercise as tolerated. Strive for 15 minutes of exercise most days of the week. Cardiac / HealthyDiet  Continue current medications as directed  Continue plan of treatment  It is always recommended that you bring your medicationsbottles with you to each visit - this is for your safety!        Suzan Gonzalez, APRN

## 2022-07-20 ENCOUNTER — OFFICE VISIT (OUTPATIENT)
Dept: CARDIOLOGY CLINIC | Age: 72
End: 2022-07-20
Payer: MEDICARE

## 2022-07-20 VITALS
DIASTOLIC BLOOD PRESSURE: 64 MMHG | HEART RATE: 76 BPM | BODY MASS INDEX: 27.23 KG/M2 | WEIGHT: 148 LBS | HEIGHT: 62 IN | SYSTOLIC BLOOD PRESSURE: 106 MMHG

## 2022-07-20 DIAGNOSIS — I10 ESSENTIAL HYPERTENSION: ICD-10-CM

## 2022-07-20 DIAGNOSIS — E78.2 MIXED HYPERLIPIDEMIA: ICD-10-CM

## 2022-07-20 DIAGNOSIS — I25.10 CORONARY ARTERY DISEASE INVOLVING NATIVE CORONARY ARTERY OF NATIVE HEART WITHOUT ANGINA PECTORIS: Primary | ICD-10-CM

## 2022-07-20 PROCEDURE — 1090F PRES/ABSN URINE INCON ASSESS: CPT | Performed by: CLINICAL NURSE SPECIALIST

## 2022-07-20 PROCEDURE — 3017F COLORECTAL CA SCREEN DOC REV: CPT | Performed by: CLINICAL NURSE SPECIALIST

## 2022-07-20 PROCEDURE — 93000 ELECTROCARDIOGRAM COMPLETE: CPT | Performed by: CLINICAL NURSE SPECIALIST

## 2022-07-20 PROCEDURE — G8400 PT W/DXA NO RESULTS DOC: HCPCS | Performed by: CLINICAL NURSE SPECIALIST

## 2022-07-20 PROCEDURE — G8427 DOCREV CUR MEDS BY ELIG CLIN: HCPCS | Performed by: CLINICAL NURSE SPECIALIST

## 2022-07-20 PROCEDURE — 99214 OFFICE O/P EST MOD 30 MIN: CPT | Performed by: CLINICAL NURSE SPECIALIST

## 2022-07-20 PROCEDURE — G8417 CALC BMI ABV UP PARAM F/U: HCPCS | Performed by: CLINICAL NURSE SPECIALIST

## 2022-07-20 PROCEDURE — 1123F ACP DISCUSS/DSCN MKR DOCD: CPT | Performed by: CLINICAL NURSE SPECIALIST

## 2022-07-20 PROCEDURE — 1036F TOBACCO NON-USER: CPT | Performed by: CLINICAL NURSE SPECIALIST

## 2022-07-20 RX ORDER — MONTELUKAST SODIUM 10 MG/1
TABLET ORAL
COMMUNITY

## 2022-07-20 ASSESSMENT — ENCOUNTER SYMPTOMS
COUGH: 0
SHORTNESS OF BREATH: 0
CHEST TIGHTNESS: 0
FACIAL SWELLING: 0
EYE REDNESS: 0
NAUSEA: 0
VOMITING: 0
ABDOMINAL PAIN: 0
WHEEZING: 0

## 2022-07-20 NOTE — PATIENT INSTRUCTIONS
Return in about 1 year (around 7/20/2023) for Dr. Clotilde Nguyen.  1 year  Encourage healthy diet and exercise  Keep cholesterol and BP under control

## 2022-07-20 NOTE — PROGRESS NOTES
[facility], [address] [place] [address]  Phone: [phone]  Fax: [phone]    OFFICE VISIT:  2022    [address]: 1950    Reason For Visit:  Lindsey Rolon is a 66 y.o. female who is here for Follow-up, Coronary Artery Disease, and Hypertension    HPI  Patient is here for follow-up today with a history of nonobstructive CAD found per heart cath in 2019. Other history includes hypertension hyperlipidemia. She reports she has had problems with falls recently due to balance issues. She is doing some physical therapy. She has an abrasion on her left arm. Patient denies chest pain, unusual dyspnea, sudden weight gain,, orthopnea, PND, palpitations. Isak Song is PCP.   Lamar Crespo has the following history as recorded in [facility]:    Patient Active Problem List    Diagnosis Date Noted    Coronary artery disease involving native coronary artery     Essential hypertension 2019    Mixed hyperlipidemia 2019     Past Medical History:   Diagnosis Date    GERD (gastroesophageal reflux disease)     Gout     Hyperglycemia     Hyperlipidemia     Hypertension     Migraine     Psoriatic arthritis ([facility] [address]     Systemic lupus erythematosus (HCC)      Past Surgical History:   Procedure Laterality Date    BREAST BIOPSY      HYSTERECTOMY, TOTAL ABDOMINAL (CERVIX REMOVED)      KNEE ARTHROSCOPY Right      Family History   Problem Relation Age of Onset    Hypertension Mother     Diabetes Mother     Coronary Art Dis Father     Hypertension Father     Heart Attack Paternal Grandfather          age 43 of MI     Social History     Tobacco Use    Smoking status: Never    Smokeless tobacco: Never   Substance Use Topics    Alcohol use: Not Currently      Current Outpatient Medications   Medication Sig Dispense Refill    montelukast (SINGULAIR) 10 MG tablet montelukast 10 mg tablet   TAKE ONE TABLET BY MOUTH AT BEDTIME      methotrexate (RHEUMATREX) 2.5 MG chemo tablet Take 2.5 mg by mouth every 7 days Only on Monday      folic acid (FOLVITE) 1 MG tablet TAKE ONE TABLET BY MOUTH EVERY DAY      atenolol (TENORMIN) 50 MG tablet Take 1 tablet by mouth 2 times daily 180 tablet 3    pregabalin (LYRICA) 50 MG capsule Take 50 mg by mouth 3 times daily. amLODIPine (NORVASC) 5 MG tablet Take 10 mg by mouth daily       estrogens, conjugated, (PREMARIN) 0.9 MG tablet Take 0.9 mg by mouth three times a week       FLUoxetine (PROZAC) 40 MG capsule Take 40 mg by mouth daily      rosuvastatin (CRESTOR) 10 MG tablet Take 10 mg by mouth daily      lisinopril (PRINIVIL;ZESTRIL) 40 MG tablet Take 40 mg by mouth daily      albuterol sulfate  (90 Base) MCG/ACT inhaler Inhale 2 puffs into the lungs every 6 hours as needed for Wheezing      aspirin 81 MG tablet Take 81 mg by mouth daily      carisoprodol (SOMA) 350 MG tablet Take 350 mg by mouth 4 times daily as needed for Muscle spasms. rizatriptan (MAXALT) 10 MG tablet Take 10 mg by mouth once as needed for Migraine May repeat in 2 hours if needed      furosemide (LASIX) 20 MG tablet Take 20 mg by mouth as needed       colchicine (COLCRYS) 0.6 MG tablet Take 0.6 mg by mouth 2 times daily       HYDROcodone-acetaminophen (NORCO)  MG per tablet Take 1 tablet by mouth every 6 hours as needed for Pain. omeprazole (PRILOSEC) 20 MG delayed release capsule Take 20 mg by mouth daily      predniSONE (DELTASONE) 5 MG tablet Take 5 mg by mouth as needed       fluticasone (FLONASE) 50 MCG/ACT nasal spray 1 spray by Each Nostril route daily      loratadine (CLARITIN) 10 MG tablet Take 10 mg by mouth daily      guaiFENesin (MUCINEX) 600 MG extended release tablet Take 600 mg by mouth 2 times daily        No current facility-administered medications for this visit.      Allergies: Biaxin [clarithromycin], Hctz [hydrochlorothiazide], Hepatitis b virus vaccines, Imitrex [sumatriptan], Influenza vaccines, Pcn and regular rhythm. Heart sounds: Murmur (1/6 systolic) heard. No friction rub. No gallop. Comments: No carotid bruit  Pulmonary:      Effort: Pulmonary effort is normal. No respiratory distress. Breath sounds: Normal breath sounds. No wheezing or rales. Abdominal:      Palpations: Abdomen is soft. Tenderness: There is no abdominal tenderness. Musculoskeletal:         General: No swelling or deformity. Cervical back: Neck supple. No muscular tenderness. Right lower leg: Edema (trace) present. Left lower leg: Edema (trace) present. Comments: Ambulates with walker   Skin:     General: Skin is warm and dry. Findings: No rash. Comments: Abrasion, left arm with dressing   Neurological:      Mental Status: She is alert and oriented to person, place, and time. Cranial Nerves: No cranial nerve deficit. Psychiatric:         Behavior: Behavior normal.         Judgment: Judgment normal.       Data:  EKG shows sinus with right bundle branch block    Heart Cath 10/19  Conclusions    Nonobstructive CAD. Normal LV ejection fraction. Mild elevation LV end-diastolic pressures consistent with mild diastolic   dysfunction. Assessment:     Diagnosis Orders   1. Coronary artery disease involving native coronary artery of native heart without angina pectoris        2. Essential hypertension  EKG 12 lead      3. Mixed hyperlipidemia            CAD-stable. Nonobstructive disease per heart cath last year. Discussed cardiac lifestyle risk factor modification including control of hypertension, hyperlipidemia, healthy diet, regular exercise    Hypertension-well-controlled on current regimen with atenolol, amlodipine, lisinopril    Hyperlipidemia-well-controlled on current regimen per patient report with rosuvastatin. We will request most recent labs from PCP office    Plan    Return in about 1 year (around 7/20/2023) for Dr. Daniel Jacinto.  1 year  Encourage healthy diet and exercise  Keep cholesterol and BP under control    Call with any questionsor concerns  Follow up with Marcial Garrido for non cardiac problems  Report any new problems  Cardiovascular Fitness-Exercise as tolerated. Strive for 15 minutes of exercise most days of the week. Cardiac / HealthyDiet  Continue current medications as directed  Continue plan of treatment  It is always recommended that you bring your medicationsbottles with you to each visit - this is for your safety!        TAY Nichols